# Patient Record
Sex: FEMALE
[De-identification: names, ages, dates, MRNs, and addresses within clinical notes are randomized per-mention and may not be internally consistent; named-entity substitution may affect disease eponyms.]

---

## 2019-08-07 ENCOUNTER — HOSPITAL ENCOUNTER (OUTPATIENT)
Dept: HOSPITAL 5 - US | Age: 69
Discharge: HOME | End: 2019-08-07
Attending: INTERNAL MEDICINE
Payer: MEDICARE

## 2019-08-07 DIAGNOSIS — I71.4: Primary | ICD-10-CM

## 2019-08-07 PROCEDURE — 76775 US EXAM ABDO BACK WALL LIM: CPT

## 2019-08-07 NOTE — ULTRASOUND REPORT
ULTRASOUND AORTA SCAN



HISTORY: Aortic aneurysm of unspecified site.



TECHNIQUE: Transabdominal ultrasound images with color and spectral Doppler imaging.



FINDINGS:



No comparison.



The proximal aorta measures 1.9 cm in diameter. The mid aorta measures 1.5 cm. The distal aorta measu
res 1.6 cm. The right and left common iliac arteries measure 0.8 and 0.7 cm respectively. There is no
 evidence for stenosis, aneurysm or large dissection. No significant atherosclerotic disease.



IMPRESSION:

Normal aorta. No aortic aneurysm is identified.



Signer Name: Maynor Hays Jr, MD 

Signed: 8/7/2019 8:33 AM

 Workstation Name: [id number]

## 2020-02-17 ENCOUNTER — HOSPITAL ENCOUNTER (OUTPATIENT)
Dept: HOSPITAL 5 - LAB | Age: 70
Discharge: HOME | End: 2020-02-17
Attending: INTERNAL MEDICINE
Payer: MEDICARE

## 2020-02-17 DIAGNOSIS — E78.5: Primary | ICD-10-CM

## 2020-02-17 LAB — HDLC SERPL-MCNC: 49 MG/DL (ref 40–59)

## 2020-02-17 PROCEDURE — 36415 COLL VENOUS BLD VENIPUNCTURE: CPT

## 2020-02-17 PROCEDURE — 80061 LIPID PANEL: CPT
